# Patient Record
Sex: MALE
[De-identification: names, ages, dates, MRNs, and addresses within clinical notes are randomized per-mention and may not be internally consistent; named-entity substitution may affect disease eponyms.]

---

## 2019-08-22 ENCOUNTER — APPOINTMENT (OUTPATIENT)
Dept: PHYSICAL MEDICINE AND REHAB | Facility: CLINIC | Age: 24
End: 2019-08-22
Payer: COMMERCIAL

## 2019-08-22 VITALS — HEIGHT: 69 IN | BODY MASS INDEX: 25.18 KG/M2 | WEIGHT: 170 LBS

## 2019-08-22 DIAGNOSIS — Z78.9 OTHER SPECIFIED HEALTH STATUS: ICD-10-CM

## 2019-08-22 DIAGNOSIS — M54.12 RADICULOPATHY, CERVICAL REGION: ICD-10-CM

## 2019-08-22 DIAGNOSIS — G50.1 ATYPICAL FACIAL PAIN: ICD-10-CM

## 2019-08-22 DIAGNOSIS — M54.2 CERVICALGIA: ICD-10-CM

## 2019-08-22 DIAGNOSIS — Z72.89 OTHER PROBLEMS RELATED TO LIFESTYLE: ICD-10-CM

## 2019-08-22 PROBLEM — Z00.00 ENCOUNTER FOR PREVENTIVE HEALTH EXAMINATION: Status: ACTIVE | Noted: 2019-08-22

## 2019-08-22 PROCEDURE — 72040 X-RAY EXAM NECK SPINE 2-3 VW: CPT

## 2019-08-22 PROCEDURE — 99204 OFFICE O/P NEW MOD 45 MIN: CPT

## 2019-08-23 PROBLEM — Z78.9 NO PERTINENT PAST MEDICAL HISTORY: Status: RESOLVED | Noted: 2019-08-22 | Resolved: 2019-08-23

## 2019-08-23 PROBLEM — M54.2 NECK PAIN: Status: ACTIVE | Noted: 2019-08-23

## 2019-08-23 PROBLEM — Z72.89 CONSUMES ALCOHOL: Status: ACTIVE | Noted: 2019-08-22

## 2019-08-23 PROBLEM — G50.1 FACIAL PAIN, ATYPICAL: Status: ACTIVE | Noted: 2019-08-23

## 2019-08-23 PROBLEM — Z78.9 DOES NOT USE ILLICIT DRUGS: Status: ACTIVE | Noted: 2019-08-22

## 2019-08-23 NOTE — PHYSICAL EXAM
[Normal] : Oriented to person, place, and time, insight and judgement were intact and the affect was normal [de-identified] : no gross deformity, TTP over the right cervical paraspinals, ROM : pain with left lateral rotation, negative Spurling's, negative Diego's\par  [de-identified] : non tender

## 2019-08-23 NOTE — ASSESSMENT
[FreeTextEntry1] : Patient has had pain for over 5 years with radicular symptoms, has tried over 6 weeks of physical therapy without improvement, will need MRI of C spine to help guide next step in management. Recommend seeing neurologist for complaints of facial pain, gave list. Regarding abdominal pain - non acute, advised to see PCP for further workup.

## 2019-08-23 NOTE — HISTORY OF PRESENT ILLNESS
[FreeTextEntry1] : Location: neck right side \par Quality: numbness, tingling \par Duration: 5 years \par Context: patient has been feeling neck pain which radiates down to right side of arm and fingers, patient also stated that when looking around feels like everything is slow and mentioned abdominal pain as well. Also complains of facial pain.  \par Aggravating Factors: patient states discomfort is there all the time, lifting heavy objects \par Conservative treatment: has tried heat and ice, ointments but nothing seems to work \par Associated Symptoms: pins and needles \par Prior Studies: has done  xrays but nothing was found \par

## 2024-08-14 ENCOUNTER — EMERGENCY (EMERGENCY)
Facility: HOSPITAL | Age: 29
LOS: 1 days | Discharge: HOME | End: 2024-08-14
Admitting: EMERGENCY MEDICINE
Payer: COMMERCIAL

## 2024-08-14 VITALS
RESPIRATION RATE: 18 BRPM | OXYGEN SATURATION: 97 % | TEMPERATURE: 98 F | DIASTOLIC BLOOD PRESSURE: 84 MMHG | HEART RATE: 62 BPM | HEIGHT: 69 IN | SYSTOLIC BLOOD PRESSURE: 129 MMHG | WEIGHT: 175.05 LBS

## 2024-08-14 PROCEDURE — 94640 AIRWAY INHALATION TREATMENT: CPT

## 2024-08-14 PROCEDURE — 99284 EMERGENCY DEPT VISIT MOD MDM: CPT

## 2024-08-14 PROCEDURE — 99284 EMERGENCY DEPT VISIT MOD MDM: CPT | Mod: 25

## 2024-08-14 RX ORDER — IBUPROFEN 200 MG
600 TABLET ORAL ONCE
Refills: 0 | Status: COMPLETED | OUTPATIENT
Start: 2024-08-14 | End: 2024-08-14

## 2024-08-14 RX ORDER — LIDOCAINE HYDROCHLORIDE 20 MG/ML
10 JELLY TOPICAL ONCE
Refills: 0 | Status: COMPLETED | OUTPATIENT
Start: 2024-08-14 | End: 2024-08-14

## 2024-08-14 RX ORDER — LIDOCAINE HCL/PF 10 MG/ML
4 VIAL (ML) INJECTION ONCE
Refills: 0 | Status: COMPLETED | OUTPATIENT
Start: 2024-08-14 | End: 2024-08-14

## 2024-08-14 RX ADMIN — LIDOCAINE HYDROCHLORIDE 10 MILLILITER(S): 20 JELLY TOPICAL at 18:37

## 2024-08-14 RX ADMIN — Medication 4 MILLILITER(S): at 18:48

## 2024-08-16 DIAGNOSIS — T17.208A UNSPECIFIED FOREIGN BODY IN PHARYNX CAUSING OTHER INJURY, INITIAL ENCOUNTER: ICD-10-CM
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
